# Patient Record
Sex: MALE | Race: WHITE | Employment: FULL TIME | URBAN - METROPOLITAN AREA
[De-identification: names, ages, dates, MRNs, and addresses within clinical notes are randomized per-mention and may not be internally consistent; named-entity substitution may affect disease eponyms.]

---

## 2022-02-03 ENCOUNTER — APPOINTMENT (OUTPATIENT)
Dept: CT IMAGING | Age: 62
End: 2022-02-03
Attending: EMERGENCY MEDICINE
Payer: COMMERCIAL

## 2022-02-03 ENCOUNTER — HOSPITAL ENCOUNTER (EMERGENCY)
Age: 62
Discharge: HOME | End: 2022-02-03
Attending: EMERGENCY MEDICINE
Payer: COMMERCIAL

## 2022-02-03 VITALS
DIASTOLIC BLOOD PRESSURE: 98 MMHG | OXYGEN SATURATION: 98 % | SYSTOLIC BLOOD PRESSURE: 166 MMHG | WEIGHT: 252 LBS | BODY MASS INDEX: 34.13 KG/M2 | HEART RATE: 61 BPM | RESPIRATION RATE: 18 BRPM | HEIGHT: 72 IN | TEMPERATURE: 98.4 F

## 2022-02-03 DIAGNOSIS — K57.90 DIVERTICULOSIS: Primary | ICD-10-CM

## 2022-02-03 DIAGNOSIS — K62.5 RECTAL BLEEDING: ICD-10-CM

## 2022-02-03 LAB
ABO + RH BLD: NORMAL
ALBUMIN SERPL-MCNC: 3.5 G/DL (ref 3.5–5)
ALBUMIN/GLOB SERPL: 0.9 {RATIO} (ref 1.1–2.2)
ALP SERPL-CCNC: 70 U/L (ref 45–117)
ALT SERPL-CCNC: 48 U/L (ref 12–78)
ANION GAP SERPL CALC-SCNC: 7 MMOL/L (ref 5–15)
AST SERPL W P-5'-P-CCNC: 30 U/L (ref 15–37)
BASOPHILS # BLD: 0 K/UL (ref 0–0.1)
BASOPHILS NFR BLD: 0 % (ref 0–1)
BILIRUB SERPL-MCNC: 0.6 MG/DL (ref 0.2–1)
BLOOD GROUP ANTIBODIES SERPL: NEGATIVE
BUN SERPL-MCNC: 24 MG/DL (ref 6–20)
BUN/CREAT SERPL: 19 (ref 12–20)
CA-I BLD-MCNC: 9.3 MG/DL (ref 8.5–10.1)
CHLORIDE SERPL-SCNC: 108 MMOL/L (ref 97–108)
CO2 SERPL-SCNC: 22 MMOL/L (ref 21–32)
CREAT SERPL-MCNC: 1.29 MG/DL (ref 0.7–1.3)
DIFFERENTIAL METHOD BLD: NORMAL
EOSINOPHIL # BLD: 0.1 K/UL (ref 0–0.4)
EOSINOPHIL NFR BLD: 2 % (ref 0–7)
ERYTHROCYTE [DISTWIDTH] IN BLOOD BY AUTOMATED COUNT: 13.3 % (ref 11.5–14.5)
GLOBULIN SER CALC-MCNC: 3.7 G/DL (ref 2–4)
GLUCOSE SERPL-MCNC: 119 MG/DL (ref 65–100)
HCT VFR BLD AUTO: 44.9 % (ref 36.6–50.3)
HGB BLD-MCNC: 15.5 G/DL (ref 12.1–17)
IMM GRANULOCYTES # BLD AUTO: 0 K/UL (ref 0–0.04)
IMM GRANULOCYTES NFR BLD AUTO: 0 % (ref 0–0.5)
LACTATE SERPL-SCNC: 1.8 MMOL/L (ref 0.4–2)
LYMPHOCYTES # BLD: 1.2 K/UL (ref 0.8–3.5)
LYMPHOCYTES NFR BLD: 16 % (ref 12–49)
MCH RBC QN AUTO: 31.2 PG (ref 26–34)
MCHC RBC AUTO-ENTMCNC: 34.5 G/DL (ref 30–36.5)
MCV RBC AUTO: 90.3 FL (ref 80–99)
MONOCYTES # BLD: 0.6 K/UL (ref 0–1)
MONOCYTES NFR BLD: 8 % (ref 5–13)
NEUTS SEG # BLD: 5.6 K/UL (ref 1.8–8)
NEUTS SEG NFR BLD: 74 % (ref 32–75)
NRBC # BLD: 0 K/UL (ref 0–0.01)
NRBC BLD-RTO: 0 PER 100 WBC
PLATELET # BLD AUTO: 297 K/UL (ref 150–400)
PMV BLD AUTO: 9.4 FL (ref 8.9–12.9)
POTASSIUM SERPL-SCNC: 4.6 MMOL/L (ref 3.5–5.1)
PROT SERPL-MCNC: 7.2 G/DL (ref 6.4–8.2)
RBC # BLD AUTO: 4.97 M/UL (ref 4.1–5.7)
SODIUM SERPL-SCNC: 137 MMOL/L (ref 136–145)
SPECIMEN EXP DATE BLD: NORMAL
WBC # BLD AUTO: 7.6 K/UL (ref 4.1–11.1)

## 2022-02-03 PROCEDURE — 85025 COMPLETE CBC W/AUTO DIFF WBC: CPT

## 2022-02-03 PROCEDURE — 80053 COMPREHEN METABOLIC PANEL: CPT

## 2022-02-03 PROCEDURE — 74174 CTA ABD&PLVS W/CONTRAST: CPT

## 2022-02-03 PROCEDURE — 83605 ASSAY OF LACTIC ACID: CPT

## 2022-02-03 PROCEDURE — 74011000636 HC RX REV CODE- 636: Performed by: EMERGENCY MEDICINE

## 2022-02-03 PROCEDURE — 86900 BLOOD TYPING SEROLOGIC ABO: CPT

## 2022-02-03 PROCEDURE — 99282 EMERGENCY DEPT VISIT SF MDM: CPT

## 2022-02-03 PROCEDURE — 74011250636 HC RX REV CODE- 250/636: Performed by: EMERGENCY MEDICINE

## 2022-02-03 PROCEDURE — 96374 THER/PROPH/DIAG INJ IV PUSH: CPT

## 2022-02-03 PROCEDURE — C9113 INJ PANTOPRAZOLE SODIUM, VIA: HCPCS | Performed by: EMERGENCY MEDICINE

## 2022-02-03 PROCEDURE — 74011000250 HC RX REV CODE- 250: Performed by: EMERGENCY MEDICINE

## 2022-02-03 PROCEDURE — 36415 COLL VENOUS BLD VENIPUNCTURE: CPT

## 2022-02-03 RX ORDER — SODIUM CHLORIDE 9 MG/ML
125 INJECTION, SOLUTION INTRAVENOUS CONTINUOUS
Status: DISCONTINUED | OUTPATIENT
Start: 2022-02-03 | End: 2022-02-03 | Stop reason: HOSPADM

## 2022-02-03 RX ADMIN — SODIUM CHLORIDE 40 MG: 9 INJECTION, SOLUTION INTRAMUSCULAR; INTRAVENOUS; SUBCUTANEOUS at 12:04

## 2022-02-03 RX ADMIN — IOPAMIDOL 100 ML: 755 INJECTION, SOLUTION INTRAVENOUS at 12:41

## 2022-02-03 RX ADMIN — SODIUM CHLORIDE 125 ML/HR: 9 INJECTION, SOLUTION INTRAMUSCULAR; INTRAVENOUS; SUBCUTANEOUS at 12:02

## 2022-02-03 NOTE — ED PROVIDER NOTES
EMERGENCY DEPARTMENT HISTORY AND PHYSICAL EXAM      Date: 2/3/2022  Patient Name: Martir Morrow    History of Presenting Illness     Chief Complaint   Patient presents with    Melena       History Provided By: Patient    HPI: Martir Morrow, 64 y.o. male with a past medical history significant hypertension presents to the ED with chief complaint of Melena  . 59-year-old male with a history of IBS on meloxicam also takes an aspirin daily no other blood thinners. At 145 this morning he was woken up to bright red blood per rectum this happened multiple times. Had a brief episode of sharp pain in the right lower quadrant but since resolved. Was sharp at that time. Otherwise no abdominal pain no rectal pain. No stool that is dark or black. No nausea vomiting. Last colonoscopy was 10 years ago that was normal.          There are no other complaints, changes, or physical findings at this time. PCP: Eriberto Pappas MD    Current Facility-Administered Medications   Medication Dose Route Frequency Provider Last Rate Last Admin    pantoprazole (PROTONIX) 40 mg in 0.9% sodium chloride 10 mL injection  40 mg IntraVENous NOW Adrienne Borden MD        0.9% sodium chloride infusion  125 mL/hr IntraVENous CONTINUOUS Sergio Marina  mL/hr at 02/03/22 1202 125 mL/hr at 02/03/22 1202       Past History     Past Medical History:  Past Medical History:   Diagnosis Date    Hypertension     Sleep apnea        Past Surgical History:  History reviewed. No pertinent surgical history. Family History:  History reviewed. No pertinent family history. Social History:  Social History     Tobacco Use    Smoking status: Former Smoker    Smokeless tobacco: Never Used   Vaping Use    Vaping Use: Never used   Substance Use Topics    Alcohol use: Yes    Drug use: Not Currently       Allergies:   Allergies   Allergen Reactions    Cymbalta [Duloxetine] Other (comments)     Stated that is makes him dizzy Review of Systems   Review of Systems   Constitutional: Negative. Negative for chills, fatigue and fever. HENT: Negative. Negative for congestion, ear pain, nosebleeds and sore throat. Eyes: Negative. Negative for pain, discharge and visual disturbance. Respiratory: Negative. Negative for cough, chest tightness and shortness of breath. Cardiovascular: Negative. Negative for chest pain and leg swelling. Gastrointestinal: Positive for abdominal pain and blood in stool. Negative for constipation, diarrhea, nausea and vomiting. Endocrine: Negative. Genitourinary: Negative. Negative for difficulty urinating, dysuria and flank pain. Musculoskeletal: Negative. Negative for back pain and myalgias. Skin: Negative. Negative for rash and wound. Allergic/Immunologic: Negative. Neurological: Negative. Negative for dizziness, syncope, weakness, numbness and headaches. Hematological: Negative. Does not bruise/bleed easily. Psychiatric/Behavioral: Negative. Negative for agitation, confusion, hallucinations and suicidal ideas. All other systems reviewed and are negative. Physical Exam   Physical Exam  Vitals and nursing note reviewed. Constitutional:       General: He is not in acute distress. Appearance: He is normal weight. He is not ill-appearing. HENT:      Head: Normocephalic and atraumatic. Right Ear: External ear normal.      Left Ear: External ear normal.      Nose: Nose normal. No rhinorrhea. Mouth/Throat:      Mouth: Mucous membranes are moist.      Pharynx: Oropharynx is clear. Eyes:      Extraocular Movements: Extraocular movements intact. Conjunctiva/sclera: Conjunctivae normal.      Pupils: Pupils are equal, round, and reactive to light. Cardiovascular:      Rate and Rhythm: Regular rhythm. Bradycardia present. Pulses: Normal pulses. Heart sounds: Normal heart sounds.    Pulmonary:      Effort: Pulmonary effort is normal. No respiratory distress. Breath sounds: Normal breath sounds. Abdominal:      General: Abdomen is flat. Bowel sounds are normal.      Palpations: Abdomen is soft. Musculoskeletal:         General: No tenderness or deformity. Normal range of motion. Cervical back: Normal range of motion and neck supple. Skin:     General: Skin is warm and dry. Capillary Refill: Capillary refill takes less than 2 seconds. Findings: No bruising, lesion or rash. Neurological:      General: No focal deficit present. Mental Status: He is alert and oriented to person, place, and time. Mental status is at baseline. Psychiatric:         Mood and Affect: Mood normal.         Behavior: Behavior normal.         Thought Content: Thought content normal.         Judgment: Judgment normal.         Diagnostic Study Results     Labs -     Recent Results (from the past 12 hour(s))   CBC WITH AUTOMATED DIFF    Collection Time: 02/03/22 10:29 AM   Result Value Ref Range    WBC 7.6 4.1 - 11.1 K/uL    RBC 4.97 4.10 - 5.70 M/uL    HGB 15.5 12.1 - 17.0 g/dL    HCT 44.9 36.6 - 50.3 %    MCV 90.3 80.0 - 99.0 FL    MCH 31.2 26.0 - 34.0 PG    MCHC 34.5 30.0 - 36.5 g/dL    RDW 13.3 11.5 - 14.5 %    PLATELET 804 537 - 325 K/uL    MPV 9.4 8.9 - 12.9 FL    NRBC 0.0 0.0  WBC    ABSOLUTE NRBC 0.00 0.00 - 0.01 K/uL    NEUTROPHILS 74 32 - 75 %    LYMPHOCYTES 16 12 - 49 %    MONOCYTES 8 5 - 13 %    EOSINOPHILS 2 0 - 7 %    BASOPHILS 0 0 - 1 %    IMMATURE GRANULOCYTES 0 0 - 0.5 %    ABS. NEUTROPHILS 5.6 1.8 - 8.0 K/UL    ABS. LYMPHOCYTES 1.2 0.8 - 3.5 K/UL    ABS. MONOCYTES 0.6 0.0 - 1.0 K/UL    ABS. EOSINOPHILS 0.1 0.0 - 0.4 K/UL    ABS. BASOPHILS 0.0 0.0 - 0.1 K/UL    ABS. IMM.  GRANS. 0.0 0.00 - 0.04 K/UL    DF AUTOMATED     METABOLIC PANEL, COMPREHENSIVE    Collection Time: 02/03/22 10:29 AM   Result Value Ref Range    Sodium 137 136 - 145 mmol/L    Potassium 4.6 3.5 - 5.1 mmol/L    Chloride 108 97 - 108 mmol/L    CO2 22 21 - 32 mmol/L    Anion gap 7 5 - 15 mmol/L    Glucose 119 (H) 65 - 100 mg/dL    BUN 24 (H) 6 - 20 mg/dL    Creatinine 1.29 0.70 - 1.30 mg/dL    BUN/Creatinine ratio 19 12 - 20      GFR est AA >60 >60 ml/min/1.73m2    GFR est non-AA 57 (L) >60 ml/min/1.73m2    Calcium 9.3 8.5 - 10.1 mg/dL    Bilirubin, total 0.6 0.2 - 1.0 mg/dL    AST (SGOT) 30 15 - 37 U/L    ALT (SGPT) 48 12 - 78 U/L    Alk. phosphatase 70 45 - 117 U/L    Protein, total 7.2 6.4 - 8.2 g/dL    Albumin 3.5 3.5 - 5.0 g/dL    Globulin 3.7 2.0 - 4.0 g/dL    A-G Ratio 0.9 (L) 1.1 - 2.2     TYPE & SCREEN    Collection Time: 02/03/22 10:29 AM   Result Value Ref Range    Crossmatch Expiration 02/06/2022,2359     ABO/Rh(D) Blaise Maillard Positive     Antibody screen Negative    LACTIC ACID    Collection Time: 02/03/22 10:29 AM   Result Value Ref Range    Lactic acid 1.8 0.4 - 2.0 mmol/L         Radiologic Studies -   CTA ABDOMEN PELV W CONT   Final Result   No evidence of active contrast extravasation in the GI tract. Incidental findings, including extensive colonic diverticulosis, detailed as   above. CT Results  (Last 48 hours)               02/03/22 1240  CTA ABDOMEN PELV W CONT Final result    Impression:  No evidence of active contrast extravasation in the GI tract. Incidental findings, including extensive colonic diverticulosis, detailed as   above. Narrative:  EXAM: CTA ABDOMEN PELV W CONT       INDICATION: rectal bleeding       COMPARISON: None. CONTRAST: 100 mL of Isovue-370.; PO contrast was not administered. TECHNIQUE:     Multislice helical CT was performed from the diaphragm to the iliac crest prior   to intravenous contrast administration and from the diaphragm to the symphysis   pubis during uneventful rapid bolus intravenous contrast administration. Contiguous axial images were reconstructed and lung and soft tissue windows were   generated. Coronal and sagittal reformations were generated.          CT dose reduction was achieved through use of a standardized protocol tailored   for this examination and automatic exposure control for dose modulation. FINDINGS:   CT angiogram findings:   Atherosclerotic disease in the aorta without acute finding, such as aneurysm or   dissection. Visceral arteries are patent. The splenic artery/left hepatic artery arises   directly from the aorta. There is a replaced common hepatic artery arising from   the proximal SMA. Accessory right renal artery takes off at the level of L3-4. In spite of moderate atherosclerotic disease, the imaged iliac and femoral   arteries are patent. CT findings:       Lower chest: Likely mild atelectasis in the right lower lobe. Liver, biliary tree, and gallbladder: No focal hepatic mass. Gallbladder is   within normal limits. CBD is not dilated. Spleen: within normal limits. Pancreas: No mass or ductal dilatation. Kidneys and adrenals: No renal mass, obstructing calculus, or hydronephrosis. Unremarkable adrenals. Bowels: No bowel wall thickening or dilatation. Appendix is normal. Colonic   diverticulosis. No evidence of active contrast extravasation into the GI tract. Peritoneum and retroperitoneum: No ascites or pneumoperitoneum. No   lymphadenopathy or aortic aneurysm. Pelvis: No mass or calculus. Bones and abdominal wall: No destructive bone lesion. No abdominal wall mass or   hernia. T11-L5 and iliac fusion hardware. CXR Results  (Last 48 hours)    None          Medical Decision Making and ED Course   I am the first provider for this patient. I reviewed the vital signs, available nursing notes, past medical history, past surgical history, family history and social history. Vital Signs-Reviewed the patient's vital signs.   Patient Vitals for the past 12 hrs:   Temp Pulse Resp BP SpO2   02/03/22 1011 98.4 °F (36.9 °C) (!) 53 16 (!) 182/91 97 %       EKG interpretation: Records Reviewed: Previous Hospital chart. EMS run report      ED Course:   Initial assessment performed. The patients presenting problems have been discussed, and they are in agreement with the care plan formulated and outlined with them. I have encouraged them to ask questions as they arise throughout their visit. Orders Placed This Encounter    CTA ABDOMEN PELV W CONT     Standing Status:   Standing     Number of Occurrences:   1     Order Specific Question:   Transport     Answer:   Wheelchair [7]     Order Specific Question:   Reason for Exam     Answer:   rectal beeding     Order Specific Question:   Type of Contrast     Answer:   IV     Order Specific Question:   Does patient have history of Renal Disease? Answer:   No     Order Specific Question:   Oral Contrast     Answer:   Per Radiologist Protocol     Order Specific Question:   Decision Support Exception     Answer:   Emergency Medical Condition (MA) [1]    CBC WITH AUTOMATED DIFF     Standing Status:   Standing     Number of Occurrences:   1    METABOLIC PANEL, COMPREHENSIVE     Standing Status:   Standing     Number of Occurrences:   1    LACTIC ACID, PLASMA     Standing Status:   Standing     Number of Occurrences:   1    TYPE & SCREEN     ENTER SURGERY DATE IF FOR PRE-OP TESTING. Standing Status:   Standing     Number of Occurrences:   1    pantoprazole (PROTONIX) 40 mg in 0.9% sodium chloride 10 mL injection     Order Specific Question:   PPI INDICATION     Answer:   Gastritis    pantoprazole (PROTONIX) 40 mg in 0.9% sodium chloride 10 mL injection     Order Specific Question:   PPI INDICATION     Answer:   GI Bleed    0.9% sodium chloride infusion    iopamidoL (ISOVUE-370) 76 % injection 100 mL                 Provider Notes (Medical Decision Making):   26-year-old male presents with less than 12 hours of bright red blood per rectum with a brief episode of abdominal pain. On aspirin but no other anticoagulation.   Plan to evaluate for diverticulosis versus hemorrhoids versus GI bleed versus malignancy. Clinically doubt ischemic bowel. Vitals remained stable. Normal hemoglobin not anemic CT is negative for acute active extravasation probable diverticulosis causing the bleed. Will give GI for follow-up. Consults               Discharged    Procedures                       Disposition       Emergency Department Disposition:  Discharged         DISCHARGE PLAN:    Patient is discharged home. Discharge instructions provided. Patient is stable and improved at time of disposition. Vitals are stable. 1. There are no discharge medications for this patient. 2.   Follow-up Information     Follow up With Specialties Details Why Contact Info    Malachi Gee MD Family Medicine   [facility]  [facility] [address] [facility]  [phone]      Sugey Raza MD Gastroenterology   [address] [place] [address] [address]  [facility] [phone]           3. Return to ED if worse     Pt voiced they understand they plan and do not have questions at this time        Diagnosis     Clinical Impression:   1. Diverticulosis    2. Rectal bleeding        Attestations:    Joceline Hussein MD    Please note that this dictation was completed with U[employer], the computer voice recognition software. Quite often unanticipated grammatical, syntax, homophones, and other interpretive errors are inadvertently transcribed by the computer software. Please disregard these errors. Please excuse any errors that have escaped final proofreading. Thank you.

## 2022-02-03 NOTE — DISCHARGE INSTRUCTIONS
Thank you! Thank you for allowing me to care for you in the emergency department. I sincerely hope that you are satisfied with your visit today. It is my goal to provide you with excellent care. Below you will find a list of your labs and imaging from your visit today. Should you have any questions regarding these results please do not hesitate to call the emergency department. Labs -     Recent Results (from the past 12 hour(s))   CBC WITH AUTOMATED DIFF    Collection Time: 02/03/22 10:29 AM   Result Value Ref Range    WBC 7.6 4.1 - 11.1 K/uL    RBC 4.97 4.10 - 5.70 M/uL    HGB 15.5 12.1 - 17.0 g/dL    HCT 44.9 36.6 - 50.3 %    MCV 90.3 80.0 - 99.0 FL    MCH 31.2 26.0 - 34.0 PG    MCHC 34.5 30.0 - 36.5 g/dL    RDW 13.3 11.5 - 14.5 %    PLATELET 470 219 - 096 K/uL    MPV 9.4 8.9 - 12.9 FL    NRBC 0.0 0.0  WBC    ABSOLUTE NRBC 0.00 0.00 - 0.01 K/uL    NEUTROPHILS 74 32 - 75 %    LYMPHOCYTES 16 12 - 49 %    MONOCYTES 8 5 - 13 %    EOSINOPHILS 2 0 - 7 %    BASOPHILS 0 0 - 1 %    IMMATURE GRANULOCYTES 0 0 - 0.5 %    ABS. NEUTROPHILS 5.6 1.8 - 8.0 K/UL    ABS. LYMPHOCYTES 1.2 0.8 - 3.5 K/UL    ABS. MONOCYTES 0.6 0.0 - 1.0 K/UL    ABS. EOSINOPHILS 0.1 0.0 - 0.4 K/UL    ABS. BASOPHILS 0.0 0.0 - 0.1 K/UL    ABS. IMM. GRANS. 0.0 0.00 - 0.04 K/UL    DF AUTOMATED     METABOLIC PANEL, COMPREHENSIVE    Collection Time: 02/03/22 10:29 AM   Result Value Ref Range    Sodium 137 136 - 145 mmol/L    Potassium 4.6 3.5 - 5.1 mmol/L    Chloride 108 97 - 108 mmol/L    CO2 22 21 - 32 mmol/L    Anion gap 7 5 - 15 mmol/L    Glucose 119 (H) 65 - 100 mg/dL    BUN 24 (H) 6 - 20 mg/dL    Creatinine 1.29 0.70 - 1.30 mg/dL    BUN/Creatinine ratio 19 12 - 20      GFR est AA >60 >60 ml/min/1.73m2    GFR est non-AA 57 (L) >60 ml/min/1.73m2    Calcium 9.3 8.5 - 10.1 mg/dL    Bilirubin, total 0.6 0.2 - 1.0 mg/dL    AST (SGOT) 30 15 - 37 U/L    ALT (SGPT) 48 12 - 78 U/L    Alk.  phosphatase 70 45 - 117 U/L    Protein, total 7.2 6.4 - 8.2 g/dL    Albumin 3.5 3.5 - 5.0 g/dL    Globulin 3.7 2.0 - 4.0 g/dL    A-G Ratio 0.9 (L) 1.1 - 2.2     TYPE & SCREEN    Collection Time: 02/03/22 10:29 AM   Result Value Ref Range    Crossmatch Expiration 02/06/2022,2359     ABO/Rh(D) Hakeem Finders Positive     Antibody screen Negative    LACTIC ACID    Collection Time: 02/03/22 10:29 AM   Result Value Ref Range    Lactic acid 1.8 0.4 - 2.0 mmol/L       Radiologic Studies -   CTA ABDOMEN PELV W CONT   Final Result   No evidence of active contrast extravasation in the GI tract. Incidental findings, including extensive colonic diverticulosis, detailed as   above. CT Results  (Last 48 hours)                 02/03/22 1240  CTA ABDOMEN PELV W CONT Final result    Impression:  No evidence of active contrast extravasation in the GI tract. Incidental findings, including extensive colonic diverticulosis, detailed as   above. Narrative:  EXAM: CTA ABDOMEN PELV W CONT       INDICATION: rectal bleeding       COMPARISON: None. CONTRAST: 100 mL of Isovue-370.; PO contrast was not administered. TECHNIQUE:     Multislice helical CT was performed from the diaphragm to the iliac crest prior   to intravenous contrast administration and from the diaphragm to the symphysis   pubis during uneventful rapid bolus intravenous contrast administration. Contiguous axial images were reconstructed and lung and soft tissue windows were   generated. Coronal and sagittal reformations were generated. CT dose reduction was achieved through use of a standardized protocol tailored   for this examination and automatic exposure control for dose modulation. FINDINGS:   CT angiogram findings:   Atherosclerotic disease in the aorta without acute finding, such as aneurysm or   dissection. Visceral arteries are patent. The splenic artery/left hepatic artery arises   directly from the aorta.  There is a replaced common hepatic artery arising from   the proximal SMA. Accessory right renal artery takes off at the level of L3-4. In spite of moderate atherosclerotic disease, the imaged iliac and femoral   arteries are patent. CT findings:       Lower chest: Likely mild atelectasis in the right lower lobe. Liver, biliary tree, and gallbladder: No focal hepatic mass. Gallbladder is   within normal limits. CBD is not dilated. Spleen: within normal limits. Pancreas: No mass or ductal dilatation. Kidneys and adrenals: No renal mass, obstructing calculus, or hydronephrosis. Unremarkable adrenals. Bowels: No bowel wall thickening or dilatation. Appendix is normal. Colonic   diverticulosis. No evidence of active contrast extravasation into the GI tract. Peritoneum and retroperitoneum: No ascites or pneumoperitoneum. No   lymphadenopathy or aortic aneurysm. Pelvis: No mass or calculus. Bones and abdominal wall: No destructive bone lesion. No abdominal wall mass or   hernia. T11-L5 and iliac fusion hardware. CXR Results  (Last 48 hours)      None               If you feel that you have not received excellent quality care or timely care, please ask to speak to the nurse manager. Please choose us in the future for your continued health care needs. ------------------------------------------------------------------------------------------------------------  The exam and treatment you received in the Emergency Department were for an urgent problem and are not intended as complete care. It is important that you follow-up with a doctor, nurse practitioner, or physician assistant to:  (1) confirm your diagnosis,  (2) re-evaluation of changes in your illness and treatment, and  (3) for ongoing care. If your symptoms become worse or you do not improve as expected and you are unable to reach your usual health care provider, you should return to the Emergency Department. We are available 24 hours a day. Please take your discharge instructions with you when you go to your follow-up appointment. If you have any problem arranging a follow-up appointment, contact the Emergency Department immediately. If a prescription has been provided, please have it filled as soon as possible to prevent a delay in treatment. Read the entire medication instruction sheet provided to you by the pharmacy. If you have any questions or reservations about taking the medication due to side effects or interactions with other medications, please call your primary care physician or contact the ER to speak with the charge nurse. Make an appointment with your family doctor or the physician you were referred to for follow-up of this visit as instructed on your discharge paperwork, as this is a mandatory follow-up. Return to the ER if you are unable to be seen or if you are unable to be seen in a timely manner. If you have any problem arranging the follow-up visit, contact the Emergency Department immediately.

## 2022-02-03 NOTE — Clinical Note
[address] [facility] EMERGENCY DEPT  [place] [id number]  [phone]    Work/School Note    Date: 2/3/2022    To Whom It May concern:    Claudene Clay was seen and treated today in the emergency room by the following provider(s):  Attending Provider: Vannessa Hendrickson MD.      Claudene Clay is excused from work/school on 02/03/22 and 02/04/22. He is medically clear to return to work/school on 2/5/2022.        Sincerely,          Henry Alfaro MD

## 2022-02-03 NOTE — ED TRIAGE NOTES
[facility] 15 pt stated that around 1 am this am he noticed rectal bleeding; c/o ABD pain, dizziness and feeling \"foggy\"